# Patient Record
Sex: FEMALE | Race: WHITE | URBAN - METROPOLITAN AREA
[De-identification: names, ages, dates, MRNs, and addresses within clinical notes are randomized per-mention and may not be internally consistent; named-entity substitution may affect disease eponyms.]

---

## 2018-06-11 ENCOUNTER — TELEPHONE (OUTPATIENT)
Dept: DERMATOLOGY | Age: 58
End: 2018-06-11

## 2018-06-26 ENCOUNTER — PROCEDURE VISIT (OUTPATIENT)
Dept: DERMATOLOGY | Age: 58
End: 2018-06-26

## 2018-06-26 DIAGNOSIS — D18.00 ANGIOMA: Primary | ICD-10-CM

## 2018-06-26 DIAGNOSIS — I78.1 TELANGIECTASIA: ICD-10-CM

## 2018-06-26 DIAGNOSIS — Z41.1 ELECTIVE PROCEDURE FOR UNACCEPTABLE COSMETIC APPEARANCE: ICD-10-CM

## 2018-06-26 PROCEDURE — MISCHAIR23 LASER TEST SPOT: Performed by: DERMATOLOGY

## 2018-06-26 PROCEDURE — DM01300 VBEAM LASER EXTRA SMALL AREA: Performed by: DERMATOLOGY

## 2018-06-26 RX ORDER — TRETINOIN 0.1 MG/G
GEL TOPICAL NIGHTLY
COMMUNITY
End: 2021-12-06 | Stop reason: SDUPTHER

## 2018-06-26 RX ORDER — ESTRADIOL 10 UG/1
10 INSERT VAGINAL
COMMUNITY
Start: 2017-11-22 | End: 2021-12-06 | Stop reason: ALTCHOICE

## 2018-06-29 ENCOUNTER — TELEPHONE (OUTPATIENT)
Dept: DERMATOLOGY | Age: 58
End: 2018-06-29

## 2018-08-06 ENCOUNTER — PROCEDURE VISIT (OUTPATIENT)
Dept: DERMATOLOGY | Age: 58
End: 2018-08-06

## 2018-08-06 DIAGNOSIS — Z41.1 ELECTIVE PROCEDURE FOR UNACCEPTABLE COSMETIC APPEARANCE: ICD-10-CM

## 2018-08-06 DIAGNOSIS — I78.1 TELANGIECTASIA: Primary | ICD-10-CM

## 2018-08-06 PROCEDURE — MISCHAIR24 LASER FOLLOW UP SPOT: Performed by: DERMATOLOGY

## 2018-08-06 NOTE — PROGRESS NOTES
determined to be abnormal: Head/face. Dorsum of the nose with telangiectasias and cheeks with scattered red telangiectasias                Assessment and Plan     1. Spider angioma + telangiectasias  - Vbeam today  - will attempt to get records of previous trx again      Laser Procedure Note       Phoenix Nicole OF BIRTH:  1960    DATE OF VISIT:  2018  Chief Complaint   Patient presents with    Laser Treatment     PT returns today for her follow up and laser treatment. LASER: Vbeam #1  DIAGNOSIS: angioma/telangiectasias    See photo above. We discussed treatment options, including no treatment as well as the following:  - need for multiple treatments and risk of incomplete clearance, recurrence  - risk of erythema, edema, purpura, dyspigmentation and scarring    PATIENT IDENTIFIED PER PROTOCOL: yes  LOCATION(S): face  VERIFIED AND MARKED: yes  TECHNIQUES, RISKS, BENEFITS AND ALTERNATIVES EXPLAINED: yes  CONSENT SIGNED, WITNESSED AND DATED: yes        OPERATIVE REPORT    DIAGNOSIS, LOCATION, PROCEDURE RECONFIRMED: yes   EYE PROTECTION: yes  ANESTHESIA/PRE-OP MEDICATIONS: none  LASER SETTINGS:  (1)  WAVELENGTH: 595  LENS: 3x10  FLUENCE: 12.5  PULSE DURATION: 10  COOLIN/20  (2)  WAVELENGTH: 595  LENS: 10  FLUENCE: 7.5  PULSE DURATION: 10  COOLIN/20    PROCEDURE NOTE:  Individual telangiectasias on the nose and medial cheeks treated with single and double pulses with setting 1 with clearance. Remaining areas on the nose and cheeks then treated with setting 2. POST-OPERATIVE CARE/DISPOSITION: ice packs  COMPLICATIONS: none  MEDICATIONS: none  WOUND CARE INSTRUCTIONS PROVIDED: yes    F/u 1-2 mos.     75 - follow up - small

## 2018-12-10 ENCOUNTER — OFFICE VISIT (OUTPATIENT)
Dept: DERMATOLOGY | Age: 58
End: 2018-12-10
Payer: COMMERCIAL

## 2018-12-10 DIAGNOSIS — I78.1 SPIDER ANGIOMA: ICD-10-CM

## 2018-12-10 DIAGNOSIS — D22.9 MULTIPLE NEVI: Primary | ICD-10-CM

## 2018-12-10 DIAGNOSIS — I78.1 TELANGIECTASIA: ICD-10-CM

## 2018-12-10 PROCEDURE — 99213 OFFICE O/P EST LOW 20 MIN: CPT | Performed by: DERMATOLOGY

## 2018-12-10 PROCEDURE — MISCHAIR24 LASER FOLLOW UP SPOT: Performed by: DERMATOLOGY

## 2019-04-04 ENCOUNTER — PROCEDURE VISIT (OUTPATIENT)
Dept: DERMATOLOGY | Age: 59
End: 2019-04-04

## 2019-04-04 DIAGNOSIS — Z41.1 ELECTIVE PROCEDURE FOR UNACCEPTABLE COSMETIC APPEARANCE: ICD-10-CM

## 2019-04-04 DIAGNOSIS — I78.1 TELANGIECTASIA: Primary | ICD-10-CM

## 2019-04-04 PROCEDURE — MISCHAIR24 LASER FOLLOW UP SPOT: Performed by: DERMATOLOGY

## 2019-04-04 PROCEDURE — 99999 PR OFFICE/OUTPT VISIT,PROCEDURE ONLY: CPT | Performed by: DERMATOLOGY

## 2019-04-04 RX ORDER — FOLIC ACID 1 MG/1
TABLET ORAL
COMMUNITY
Start: 2019-03-29 | End: 2022-06-06

## 2019-04-04 NOTE — PROGRESS NOTES
[facility]  Jerardo Reed MD  [phone]      [facility]  1960    66 y.o. female     Date of Visit: 4/4/2019    Last seen:  for FSE    Chief Complaint: veins on the nose  Chief Complaint   Patient presents with    Laser Treatment     nose and any other spots      History of Present Illness:    Here for f/u s/p 3 treatment with Vbeam of a red lesion on the nasal dorsum, c/w angioma/telangiectasias. Has had significant improvement with 3 trx with Vbeam - had purpura transiently but reports treatment was much more tolerable than previous trx. Very happy with trx and results. Barely visible. Has a few telagniectasias on the cheeks that she would like treated again. She reports that it was treated many (7+?) times by Dr. Lore Joseph w/o clearance so was referred here and had a scab/wound with last treatment. Last treatment was around March of 2018 she believes. She does not have any records from previous trx and is unsure what type of laser/light device was used to treat - possibly IPL/BBL. ROS:  Gen: feels well; good general health  SKin: no changing moles/lesions    Past Medical History, Family History, Surgical History, Medications and Allergies reviewed. Outpatient Medications Marked as Taking for the 4/4/19 encounter (Procedure visit) with Salvador Arriaga MD   Medication Sig Dispense Refill    folic acid (FOLVITE) 1 MG tablet       tretinoin (RETIN-A) 0.01 % gel Apply topically nightly Apply topically nightly.  Estradiol (VAGIFEM) 10 MCG TABS vaginal tablet Place 10 mcg vaginally       No Known Allergies    History reviewed. No pertinent past medical history. History reviewed. No pertinent surgical history. Physical Examination     Gen, well-appearing    Dorsum of the nose with faint remaining telangiectasias and cheeks with scattered red telangiectasias    Baseline and today 4-2019 (after 3 trx):        Assessment and Plan     1.  Spider angioma + telangiectasias - improved  - Vbeam today      Laser Procedure Note       Felipa Roman   YOB: 1960    DATE OF VISIT:  2019  Chief Complaint   Patient presents with    Laser Treatment     nose and any other spots      LASER: Vbeam #4  DIAGNOSIS: angioma/telangiectasias    See photo above. We discussed treatment options, including no treatment as well as the following:  - need for multiple treatments and risk of incomplete clearance, recurrence  - risk of erythema, edema, purpura, dyspigmentation and scarring    PATIENT IDENTIFIED PER PROTOCOL: yes  LOCATION(S): face  VERIFIED AND MARKED: yes  TECHNIQUES, RISKS, BENEFITS AND ALTERNATIVES EXPLAINED: yes  CONSENT SIGNED, WITNESSED AND DATED: yes        OPERATIVE REPORT    DIAGNOSIS, LOCATION, PROCEDURE RECONFIRMED: yes   EYE PROTECTION: yes  ANESTHESIA/PRE-OP MEDICATIONS: none  LASER SETTINGS:  (1)  WAVELENGTH: 595  LENS: 7 mm, 9.75 J, 1.5 ms  30/20  (2)  WAVELENGTH: 3x10  FLUENCE: 12.5  PULSE DURATION: 10  COOLIN/20  (3)  WAVELENGTH: 595  LENS: 10  FLUENCE: 7.5  PULSE DURATION: 10  COOLIN/20    PROCEDURE NOTE:  Individual telangiectasias on the nose and medial cheeks treated with single and double pulses with setting 1 with clearance. Center of lesion on the nose treated with 2 pulses with setting 2. Remaining areas on the nose and cheeks then treated with setting 3    POST-OPERATIVE CARE/DISPOSITION: ice packs  COMPLICATIONS: none  MEDICATIONS: none  WOUND CARE INSTRUCTIONS PROVIDED: yes    F/u 1-2 mos.     75 - follow up - small

## 2019-12-12 ENCOUNTER — PROCEDURE VISIT (OUTPATIENT)
Dept: DERMATOLOGY | Age: 59
End: 2019-12-12
Payer: COMMERCIAL

## 2019-12-12 DIAGNOSIS — D48.5 NEOPLASM OF UNCERTAIN BEHAVIOR OF SKIN: ICD-10-CM

## 2019-12-12 DIAGNOSIS — D22.9 MULTIPLE NEVI: Primary | ICD-10-CM

## 2019-12-12 DIAGNOSIS — I78.1 TELANGIECTASIA: ICD-10-CM

## 2019-12-12 DIAGNOSIS — I78.1 SPIDER ANGIOMA: ICD-10-CM

## 2019-12-12 DIAGNOSIS — Z41.1 ELECTIVE PROCEDURE FOR UNACCEPTABLE COSMETIC APPEARANCE: ICD-10-CM

## 2019-12-12 PROCEDURE — DM01300 VBEAM LASER EXTRA SMALL AREA: Performed by: DERMATOLOGY

## 2019-12-12 PROCEDURE — 11102 TANGNTL BX SKIN SINGLE LES: CPT | Performed by: DERMATOLOGY

## 2019-12-12 PROCEDURE — 99213 OFFICE O/P EST LOW 20 MIN: CPT | Performed by: DERMATOLOGY

## 2019-12-20 ENCOUNTER — TELEPHONE (OUTPATIENT)
Dept: DERMATOLOGY | Age: 59
End: 2019-12-20

## 2020-01-02 ENCOUNTER — TELEPHONE (OUTPATIENT)
Dept: DERMATOLOGY | Age: 60
End: 2020-01-02

## 2021-09-13 ENCOUNTER — TELEPHONE (OUTPATIENT)
Dept: DERMATOLOGY | Age: 61
End: 2021-09-13

## 2021-09-13 NOTE — TELEPHONE ENCOUNTER
Pt calling wanting to schedule appt in the fall or December with  pls return call back @ [phone] to discuss

## 2021-12-06 ENCOUNTER — OFFICE VISIT (OUTPATIENT)
Dept: DERMATOLOGY | Age: 61
End: 2021-12-06
Payer: COMMERCIAL

## 2021-12-06 VITALS — TEMPERATURE: 97.6 F

## 2021-12-06 DIAGNOSIS — L81.4 LENTIGINES: ICD-10-CM

## 2021-12-06 DIAGNOSIS — D22.9 MULTIPLE NEVI: Primary | ICD-10-CM

## 2021-12-06 DIAGNOSIS — I78.1 TELANGIECTASIA: ICD-10-CM

## 2021-12-06 PROCEDURE — G8427 DOCREV CUR MEDS BY ELIG CLIN: HCPCS | Performed by: DERMATOLOGY

## 2021-12-06 PROCEDURE — 3017F COLORECTAL CA SCREEN DOC REV: CPT | Performed by: DERMATOLOGY

## 2021-12-06 PROCEDURE — G8484 FLU IMMUNIZE NO ADMIN: HCPCS | Performed by: DERMATOLOGY

## 2021-12-06 PROCEDURE — 1036F TOBACCO NON-USER: CPT | Performed by: DERMATOLOGY

## 2021-12-06 PROCEDURE — DM01310 VBEAM LASER SMALL OR 2 AREAS: Performed by: DERMATOLOGY

## 2021-12-06 PROCEDURE — 99213 OFFICE O/P EST LOW 20 MIN: CPT | Performed by: DERMATOLOGY

## 2021-12-06 PROCEDURE — G8421 BMI NOT CALCULATED: HCPCS | Performed by: DERMATOLOGY

## 2021-12-06 RX ORDER — LANOLIN ALCOHOL/MO/W.PET/CERES
CREAM (GRAM) TOPICAL
COMMUNITY

## 2021-12-06 RX ORDER — ESTRADIOL 0.1 MG/G
CREAM VAGINAL
COMMUNITY
Start: 2021-11-04

## 2021-12-06 RX ORDER — TRETINOIN 0.1 MG/G
GEL TOPICAL
Qty: 45 G | Refills: 5 | Status: SHIPPED | OUTPATIENT
Start: 2021-12-06

## 2021-12-06 NOTE — Clinical Note
I believe she wanted scheduled back for other laser or possibly botox - we had briefly discussed. It was one of the days you were out and I told her we'd call her later after you were back in the office to look ahead to get her scheduled for her next f/u.

## 2021-12-06 NOTE — PROGRESS NOTES
[facility]  Mainor Busby MD  [phone]      Virgilio Hernandez  1960    63 y.o. female     Date of Visit: 12/6/2021    Last seen:  for FSE and 4-2019 for laser    Chief Complaint: veins on the nose, moles/lesions  Chief Complaint   Patient presents with    Lesion(s)     TBSE, no concerns, No Hx of skin cancer    Laser Treatment     V-beam, nose     History of Present Illness:    1. Here for evaluation of multiple asx pigmented lesions on the trunk and extremities, present for many years; no change in size/shape/color of any lesions; no bleeding lesions. No personal hx of skin cancer. Dad with hx of skin cancer in his 66's. No known family hx of melanoma. 2. Here for Vbeam for telangiectasias, angioma. S/p ~4 treatment with Vbeam of a red lesion on the nasal dorsum, c/w angioma/telangiectasias. Has had significant improvement with trx with Vbeam - had purpura transiently but reports treatment was much more tolerable than previous trx. Very happy with trx and results. Barely visible. Has a few new telagniectasias on the cheeks that she would like treated again. She reports that it was treated many (7+?) times by Dr. Johnny Osman w/o clearance so was referred here and had a scab/wound with last treatment. Last treatment w them was around March of 2018. Unsure what type of laser/light device was used to treat - possibly IPL/BBL. ROS:  Gen: feels well; good general health  SKin: no changing moles/lesions    Past Medical History, Family History, Surgical History, Medications and Allergies reviewed.     Outpatient Medications Marked as Taking for the 12/6/21 encounter (Office Visit) with Marcia Treadwell MD   Medication Sig Dispense Refill    estradiol (ESTRACE) 0.1 MG/GM vaginal cream       Cholecalciferol (VITAMIN D3 PO) Take 5,000 Int'l Units by mouth daily      Calcium 500-100 MG-UNIT CHEW Take by mouth      folic acid (FOLVITE) 1 MG tablet       tretinoin (RETIN-A) 0.01 % gel Apply topically nightly Apply topically nightly. No Known Allergies    History reviewed. No pertinent past medical history. History reviewed. No pertinent surgical history. Physical Examination     Gen, well-appearing  FSE today     trunk and extremities with scattered brown macules and papules   Dorsum of the nose with faint remaining telangiectasias and cheeks with scattered red telangiectasias    Baseline and - (after 3 trx):        Assessment and Plan     1. Benign-appearing nevi and lentigines  - educ re ABCD's of MM   educ sun protection SPF 30+  encouraged skin check yearly (sooner if indicated), self checks monthly    *cerave products     2. Spider angioma + telangiectasias - improved  - Vbeam today    *path results from 2019 from [facility] (upper back - scanned in media - benign nevus)    Also briefly discussed botox, other lasers. Laser Procedure Note       Alleroberta Hence OF BIRTH:  1960    DATE OF VISIT:  2021  Chief Complaint   Patient presents with    Lesion(s)     TBSE, no concerns, No Hx of skin cancer    Laser Treatment     V-beam, nose     LASER: Vbeam #4  DIAGNOSIS: angioma/telangiectasias    See photo above.     We discussed treatment options, including no treatment as well as the following:  - need for multiple treatments and risk of incomplete clearance, recurrence  - risk of erythema, edema, purpura, dyspigmentation and scarring    PATIENT IDENTIFIED PER PROTOCOL: yes  LOCATION(S): face  VERIFIED AND MARKED: yes  TECHNIQUES, RISKS, BENEFITS AND ALTERNATIVES EXPLAINED: yes  CONSENT SIGNED, WITNESSED AND DATED: yes    OPERATIVE REPORT    DIAGNOSIS, LOCATION, PROCEDURE RECONFIRMED: yes   EYE PROTECTION: yes  ANESTHESIA/PRE-OP MEDICATIONS: none  LASER SETTINGS:  (1)  WAVELENGTH: 595  LENS: 7 mm, 9.75 J, 1.5 ms  30/20  - NOT USED TODAY  (2)  WAVELENGTH: 3x10  FLUENCE: 12.5  PULSE DURATION: 10  COOLIN/20  (3)  WAVELENGTH: 595 LENS: 10  FLUENCE: 7.5  PULSE DURATION: 10  COOLIN/20    PROCEDURE NOTE:  Individual telangiectasias on the nose and medial cheeks treated with single and double pulses with setting 2 with clearance. Remaining areas on the nose and cheeks then treated with setting 3    POST-OPERATIVE CARE/DISPOSITION: ice packs  COMPLICATIONS: none  MEDICATIONS: none  WOUND CARE INSTRUCTIONS PROVIDED: yes    F/u 1-2 mos.     200 - small  ( in the past depending on area)

## 2022-02-07 ENCOUNTER — TELEPHONE (OUTPATIENT)
Dept: DERMATOLOGY | Age: 62
End: 2022-02-07

## 2022-02-07 ENCOUNTER — PATIENT MESSAGE (OUTPATIENT)
Dept: DERMATOLOGY | Age: 62
End: 2022-02-07

## 2022-02-07 NOTE — TELEPHONE ENCOUNTER
Patient lmvm /@ 11:02 requesting a return call. Patient states her father  and is asking if it is ok to fly after receiving Botox and fillers. Pt has and appt for  at 3:45. Please advise. Thank you! [phone].

## 2022-02-07 NOTE — TELEPHONE ENCOUNTER
From: Erick Wisdom  To: Dr. Mireya Ahuja: 2022 2:45 PM EST  Subject: Tomorrows appointment     My dad  and Ill be flying to [place] Thursday morning. I have an appointment tomorrow afternoon for Botox/filler/laser. Can I fly Thursday? I also called and left a message this morning regarding this question.    Thanks,  Charlotte Beaver

## 2022-02-07 NOTE — TELEPHONE ENCOUNTER
Patient's appointment on 2/8/2022 @ [phone] and leave Thursday on 7AM for a flight to [place].     Can patient fly on Thursday am?

## 2022-02-08 ENCOUNTER — PROCEDURE VISIT (OUTPATIENT)
Dept: DERMATOLOGY | Age: 62
End: 2022-02-08

## 2022-02-08 VITALS — TEMPERATURE: 97.2 F

## 2022-02-08 DIAGNOSIS — L98.8 WRINKLES: Primary | ICD-10-CM

## 2022-02-08 DIAGNOSIS — I78.1 TELANGIECTASIA: ICD-10-CM

## 2022-02-08 PROCEDURE — DM00140 PR DERM ONLY BOTOX INJ LEVEL 5: Performed by: DERMATOLOGY

## 2022-02-08 PROCEDURE — DM01300 VBEAM LASER EXTRA SMALL AREA: Performed by: DERMATOLOGY

## 2022-02-08 NOTE — PATIENT INSTRUCTIONS
Nicotinamide 500 mg daily - twice daily    Following the procedure, the treated areas may be red or appear bruised and will likely be swollen for a few hours or up to a few days. The affected areas should be treated delicately following treatment. Discomfort and swelling should be treated with the application of hourly cool compresses the evening of the procedure. Avoid applying ice directly to the skin. If your face was treated, you may want to sleep with your head elevated on an extra pillow to help minimize swelling. Wear sunscreen daily and avoid strenuous activity following rosacea treatments to optimize results. Avoid extra aspirin, ibuprofen, vitamin E following the procedure - this may increase your chance of bruising. Improper care of the treated area while the discoloration is present may increase the chance of scarring or skin textural changes to the treated area. Please call the office if you have excessive discomfort, herpes simplex virus flare, or burns, blisters, or scabbing. Post-Injection Care:  Do not lie flat or turn upside down x 4 hrs after treatment. Do not massage treated areas for 24-48 hours (avoid clarisonic-type devices). It is OK to apply makeup and wash your face gently. It can take 2-4 days for onset of effects. Full effects can take up to 10-14 days. Call in the next 1-2 weeks if there are any concerns or questions or if you think additional treatment is needed. Bruising can occur but should not interfere with results. Avoid aspirin, ibuprofen before future treatments.

## 2022-02-08 NOTE — PROGRESS NOTES
[facility]  Babita Mulligan MD  [address] [facility]  1960    63 y.o. female     Date of Visit: 2/8/2022    Last seen: 1 month ago for Vbeam    Chief Complaint: veins on the nose, wrinkles  Chief Complaint   Patient presents with    Procedure     History of Present Illness:    1. Here for trx of facial wrinkles - glabella + perioral lines/lower face wrinkles. No previous trx. No bleeding probs or NM probs. Not pregnant or BF.    2. Here for Vbeam for telangiectasias, angioma. Most recently 1 month ago with good results/imrprovement - focally remaining. S/p ~5 treatment with Vbeam of a red lesion on the nasal dorsum, c/w angioma/telangiectasias. Has had significant improvement with trx with Vbeam - had purpura transiently but reports treatment was much more tolerable than previous trx. Very happy with trx and results. Barely visible. Has a few new telagniectasias on the cheeks that she would like treated again. She reports that it was treated many (7+?) times by Dr. Olvera Sep w/o clearance so was referred here and had a scab/wound with last treatment. Last treatment w them was around March of 2018. Unsure what type of laser/light device was used to treat - possibly IPL/BBL. ROS:  Gen: feels well; good general health    Past Medical History, Family History, Surgical History, Medications and Allergies reviewed. Outpatient Medications Marked as Taking for the 2/8/22 encounter (Procedure visit) with Justin Bonilla MD   Medication Sig Dispense Refill    estradiol (ESTRACE) 0.1 MG/GM vaginal cream       Cholecalciferol (VITAMIN D3 PO) Take 5,000 Int'l Units by mouth daily      Calcium 500-100 MG-UNIT CHEW Take by mouth      tretinoin (RETIN-A) 0.01 % gel Apply topically nightly. 45 g 5     No Known Allergies    History reviewed. No pertinent past medical history. History reviewed. No pertinent surgical history.     Physical Examination     Gen, well-appearing                  Baseline and  (after 3 trx):        Assessment and Plan     1. wrinkles  - Discussed expectations. Ed risks/SE including bruising, incomplete correction and asymmetry. Pt consented to trx. Botox (dil 3 ml in 100 units)   Total 0.6 ml to glabella  (0.075  0.15  0.15  0.15  0.075)  Total 0.1 ml to tails  Further diluted 1:1 for the upper lip (1.66 u/0.1 ml) and injected 0.05 x 4 sites to upper lip  ed no lying flat x 4 hrs   ed no massage of treated areas   ed onset   ed full effects can take up to 10-14 days   ed duration     27 units - 324    - f/u in 2 weeks and proceed with filler for perioral lines + MM area    2. Spider angioma + telangiectasias - improved  - Vbeam today    *path results from 2019 from [facility] (upper back - scanned in media - benign nevus)    Also briefly discussed botox, other lasers. Laser Procedure Note       Julito Moss OF BIRTH:  1960    DATE OF VISIT:  2022  Chief Complaint   Patient presents with    Procedure     LASER: Vbeam   DIAGNOSIS: angioma/telangiectasias    See photo above.     We discussed treatment options, including no treatment as well as the following:  - need for multiple treatments and risk of incomplete clearance, recurrence  - risk of erythema, edema, purpura, dyspigmentation and scarring    PATIENT IDENTIFIED PER PROTOCOL: yes  LOCATION(S): face  VERIFIED AND MARKED: yes  TECHNIQUES, RISKS, BENEFITS AND ALTERNATIVES EXPLAINED: yes  CONSENT SIGNED, WITNESSED AND DATED: yes    OPERATIVE REPORT    DIAGNOSIS, LOCATION, PROCEDURE RECONFIRMED: yes   EYE PROTECTION: yes  ANESTHESIA/PRE-OP MEDICATIONS: none  LASER SETTINGS:  (1)  WAVELENGTH: 595  LENS: 7 mm, 9.75 J, 1.5 ms  30/20  - NOT USED TODAY  (2)  WAVELENGTH: 3x10  FLUENCE: 12.5  PULSE DURATION: 10  COOLIN/20  (3)  WAVELENGTH: 595  LENS: 10  FLUENCE: 7.5  PULSE DURATION: 10  COOLIN/20    PROCEDURE NOTE:  Individual telangiectasias on the nose and medial cheeks treated with single and double pulses with setting 2 with clearance. Remaining areas on the nose and cheeks then treated with setting 3    POST-OPERATIVE CARE/DISPOSITION: ice packs  COMPLICATIONS: none  MEDICATIONS: none  WOUND CARE INSTRUCTIONS PROVIDED: yes    F/u 1-2 mos.     100- small  ( in the past depending on area)

## 2022-02-23 ENCOUNTER — PROCEDURE VISIT (OUTPATIENT)
Dept: DERMATOLOGY | Age: 62
End: 2022-02-23

## 2022-02-23 VITALS — TEMPERATURE: 97 F

## 2022-02-23 DIAGNOSIS — L98.8 WRINKLES: Primary | ICD-10-CM

## 2022-02-23 PROCEDURE — DM00200 PR DERM ONLY JUVEDERM ULTRA XC: Performed by: DERMATOLOGY

## 2022-02-23 NOTE — PATIENT INSTRUCTIONS
Post-Injection Care:  Do not massage treated areas for 2-3 days. It is OK to apply makeup tomorrow and wash your face gently. You may initially feel bumps or palpable product under the surface of the skin but this typically dissipates as swelling diminishes over the coming days - next 1 to 2 weeks. If you do feel that there are any visible lumps after a few days, you may start to gently massage the area after a few days to diminish the bump. Full effects can take up to 10-14 days as the filler absorbs water and incorporates into the skin. Call if you have any severely painful areas, areas of discoloration or abnormal paleness, areas with blisters or pustules or any other questions. Call in the next 2 weeks if there are any other questions or if you think additional treatment is needed. Bruising can occur but should not interfere with results. Avoid aspirin, ibuprofen before future treatments.     1 syringe - 550

## 2022-02-23 NOTE — PROGRESS NOTES
[facility]  Babita Mulligan MD  [address] [facility]  1960    63 y.o. female     Date of Visit: 2/23/2022    Last seen: 2 weeks ago    Chief Complaint: wrinkles  Chief Complaint   Patient presents with   Swathiartemio Foreman[place] Penny palominovedcasey     History of Present Illness:    Here for f/u s/p botox of facial wrinkles - glabella + perioral lines/lower face wrinkles. 1st trx 2 weeks ago. No complications. Happy with results - glabella smoother, fairly immobile and brows feel higher. No bleeding probs or NM probs. Not pregnant or BF. Here for filler for the lower face. Has had improvement in perioral lines and upper lip appears smoother/dc with trx 2 weeks ago and no negative SE/sensation. No probs drinking. Vbeam previously. S/p ~5 treatment with Vbeam of a red lesion on the nasal dorsum, c/w angioma/telangiectasias. Has had significant improvement with trx with Vbeam - had purpura transiently but reports treatment was much more tolerable than previous trx. Very happy with trx and results. Barely visible. Has a few new telagniectasias on the cheeks that she would like treated again. She reports that it was treated many (7+?) times by Dr. May Blanco w/o clearance so was referred here and had a scab/wound with last treatment. Last treatment w them was around March of 2018. Unsure what type of laser/light device was used to treat - possibly IPL/BBL. ROS:  Gen: feels well; good general health    Past Medical History, Family History, Surgical History, Medications and Allergies reviewed. Outpatient Medications Marked as Taking for the 2/23/22 encounter (Procedure visit) with Justin Bonilla MD   Medication Sig Dispense Refill    estradiol (ESTRACE) 0.1 MG/GM vaginal cream       Cholecalciferol (VITAMIN D3 PO) Take 5,000 Int'l Units by mouth daily      Calcium 500-100 MG-UNIT CHEW Take by mouth      tretinoin (RETIN-A) 0.01 % gel Apply topically nightly.  45 g 5 No Known Allergies    History reviewed. No pertinent past medical history. History reviewed. No pertinent surgical history. Physical Examination     Gen, well-appearing    Baseline 2 weeks ago:              Baseline and 4-2019 (after 3 trx):        Assessment and Plan     1. Wrinkles  Patient consented and questions answered, expectations discussed and risks/benefits discussed including bruising, asymmetry, palpable product and erythema and rare vascular compromise/necrosis. Areas anesthetized with topical LMX x 15 min. Focal lido blebs injected for cannula insertion. juvederm ultra XC injected to   - MM area and commissures with 27 g cannula and needle  - few upper lip lines with needle  - focal etched perioral cheek lines with needle    No noted complications. Treated areas iced. Ed aftercare, no rubbing, ice and call with questions. F/u prn for concerns or additional filler if desired.     550      Before and 2 weeks after botox:

## 2022-03-09 ENCOUNTER — OFFICE VISIT (OUTPATIENT)
Dept: DERMATOLOGY | Age: 62
End: 2022-03-09

## 2022-03-09 VITALS — TEMPERATURE: 45 F

## 2022-03-09 DIAGNOSIS — L98.8 WRINKLES: Primary | ICD-10-CM

## 2022-03-09 PROCEDURE — 99999 PR OFFICE/OUTPT VISIT,PROCEDURE ONLY: CPT | Performed by: DERMATOLOGY

## 2022-03-09 NOTE — PROGRESS NOTES
[facility]  De Owens MD  [address] [facility]  1960    63 y.o. female     Date of Visit: 3/9/2022    Last seen: 2 weeks ago    Chief Complaint: wrinkles  Chief Complaint   [facility]   [facility]     History of Present Illness:    Here for f/u s/p filler at last visit 2 weeks ago - MM area, perioral etched lines, upper lip focal lines and commissures. S/p botox 1 month ago for facial wrinkles - glabella + perioral lines. No complications. Happy with results - glabella smoother, overall immobile and brows feel higher. No bleeding probs or NM probs. Not pregnant or BF. Has had improvement in perioral lines and upper lip appears smoother/dc with trx 2 weeks ago and no negative SE/sensation. No probs drinking. Vbeam previously. S/p ~5 treatment with Vbeam of a red lesion on the nasal dorsum, c/w angioma/telangiectasias. Has had significant improvement with trx with Vbeam - had purpura transiently but reports treatment was much more tolerable than previous trx. Very happy with trx and results. Barely visible. Has a few new telagniectasias on the cheeks that she would like treated again. She reports that it was treated many (7+?) times by Dr. Riley Pleitez w/o clearance so was referred here and had a scab/wound with last treatment. Last treatment w them was around March of 2018. Unsure what type of laser/light device was used to treat - possibly IPL/BBL. ROS:  Gen: feels well; good general health    Past Medical History, Family History, Surgical History, Medications and Allergies reviewed.     Outpatient Medications Marked as Taking for the 3/9/22 encounter (Office Visit) with Serg Blair MD   Medication Sig Dispense Refill    estradiol (ESTRACE) 0.1 MG/GM vaginal cream       Cholecalciferol (VITAMIN D3 PO) Take 5,000 Int'l Units by mouth daily      Calcium 500-100 MG-UNIT CHEW Take by mouth      tretinoin (RETIN-A) 0.01 % gel Apply topically nightly. 45 g 5     No Known Allergies    History reviewed. No pertinent past medical history. History reviewed. No pertinent surgical history. Physical Examination     Gen, well-appearing    Baseline 2-2022              Baseline and 4-2019 (after 3 trx):        Assessment and Plan     1. Wrinkles  Patient consented and questions answered, expectations discussed and risks/benefits discussed including bruising, asymmetry, palpable product and erythema and rare vascular compromise/necrosis. Areas anesthetized with topical LMX x 15 min. Focal lido blebs injected for cannula insertion. 0.15 cc remaining juvederm ultra XC injected to   - few focal etches lines    No noted complications. Treated areas iced. Ed aftercare, no rubbing, ice and call with questions. F/u prn for concerns or additional filler if desired. NC - f/u. Before filler and 2 weeks after:            Also treated nasal dorsum with Vbeam today.   3 pulses with 3x10, 12.5, 10, 30/20 - NC

## 2022-03-13 NOTE — PROGRESS NOTES
[facility] Dermat[facility]  Alejandra Crews MD  [address] [facility]  1960    66 y.o. female     Date of Visit: 12/10/2018    Last seen: 8-2018    Chief Complaint: veins on the nose, moles/lesions  Chief Complaint   Patient presents with    Laser Treatment     History of Present Illness:    1. Here for evaluation of multiple asx pigmented lesions on the trunk and extremities, present for many years; no change in size/shape/color of any lesions; no bleeding lesions. No personal hx of skin cancer. Dad with hx of skin cancer in his 66's. No known family hx of melanoma. 2. Here for f/u s/p 2 treatment with Vbeam of a red lesion on the nasal dorsum, c/w angioma/telangiectasias. Has had significant improvement with 2 trx with Vbeam - had purpura transiently but reports treatment was much more tolerable than previous trx. Very happy with trx. She reports that it was treated many (7+?) times by Dr. Franci Arora w/o clearance so was referred here and had a scab/wound with last treatment. Last treatment was around March of 2018 she believes. She does not have any records from previous trx and is unsure what type of laser/light device was used to treat - possibly IPL/BBL. Is asx. She denies bleeding from the area. Had been present/worsening for a few years before any trx and believes that it worsened after manipulation during a facial.    ROS:  Gen: feels well; good general health  SKin: no changing moles/lesions    Past Medical History, Family History, Surgical History, Medications and Allergies reviewed. Outpatient Prescriptions Marked as Taking for the 12/10/18 encounter (Office Visit) with Guy Nugent MD   Medication Sig Dispense Refill    tretinoin (RETIN-A) 0.01 % gel Apply topically nightly Apply topically nightly.  Estradiol (VAGIFEM) 10 MCG TABS vaginal tablet Place 10 mcg vaginally       No Known Allergies    History reviewed.  No pertinent past medical history. History reviewed. No pertinent surgical history. Physical Examination     Gen, well-appearing  The following were examined and determined to be normal: Psych/Neuro, Scalp/hair, Head/face, Conjunctivae/eyelids, Gums/teeth/lips, Neck, Breast/axilla/chest, Abdomen, Back, RUE, LUE, RLE, LLE, Nails/digits and buttocks. The following were examined and determined to be abnormal: none. trunk and extremities with scattered brown macules and papules   Dorsum of the nose with faint remaining telangiectasias and cheeks with scattered red telangiectasias                Assessment and Plan     1. Benign-appearing nevi  - educ re ABCD's of MM   educ sun protection   encouraged skin check yearly (sooner if indicated), self checks monthly    2. Spider angioma + telangiectasias - improved  - Vbeam today      Laser Procedure Note       Tyler Bang   YOB: 1960    DATE OF VISIT:  12/10/2018  Chief Complaint   Patient presents with    Laser Treatment     LASER: Vbeam #1  DIAGNOSIS: angioma/telangiectasias    See photo above.     We discussed treatment options, including no treatment as well as the following:  - need for multiple treatments and risk of incomplete clearance, recurrence  - risk of erythema, edema, purpura, dyspigmentation and scarring    PATIENT IDENTIFIED PER PROTOCOL: yes  LOCATION(S): face  VERIFIED AND MARKED: yes  TECHNIQUES, RISKS, BENEFITS AND ALTERNATIVES EXPLAINED: yes  CONSENT SIGNED, WITNESSED AND DATED: yes        OPERATIVE REPORT    DIAGNOSIS, LOCATION, PROCEDURE RECONFIRMED: yes   EYE PROTECTION: yes  ANESTHESIA/PRE-OP MEDICATIONS: none  LASER SETTINGS:  (1)  WAVELENGTH: 595  LENS: 7 mm, 9.75 J, 1.5 ms  30/20  3x10  FLUENCE: 12.5  PULSE DURATION: 10  COOLIN/20  (2)  WAVELENGTH: 595  LENS: 10  FLUENCE: 7.5  PULSE DURATION: 10  COOLIN/20    PROCEDURE NOTE:  Individual telangiectasias on the nose and medial cheeks treated with single and double pulses with setting 1 Him/He

## 2022-06-06 ENCOUNTER — PROCEDURE VISIT (OUTPATIENT)
Dept: DERMATOLOGY | Age: 62
End: 2022-06-06

## 2022-06-06 DIAGNOSIS — L98.8 WRINKLES: Primary | ICD-10-CM

## 2022-06-06 DIAGNOSIS — I78.1 TELANGIECTASIA: ICD-10-CM

## 2022-06-06 PROCEDURE — DM00140 PR DERM ONLY BOTOX INJ LEVEL 5: Performed by: DERMATOLOGY

## 2022-06-06 NOTE — PATIENT INSTRUCTIONS
Post-Injection Care:  Do not lie flat or turn upside down x 4 hrs after treatment. Do not massage treated areas for 24-48 hours (avoid clarisonic-type devices). It is OK to apply makeup and wash your face gently. It can take 2-4 days for onset of effects. Full effects can take up to 10-14 days. Call in the next 1-2 weeks if there are any concerns or questions or if you think additional treatment is needed. Bruising can occur but should not interfere with results. Avoid aspirin, ibuprofen before future treatments. 324.00    Following the procedure, the treated areas may be red or appear bruised and will likely be swollen for a few hours or up to a few days. The affected areas should be treated delicately following treatment. Discomfort and swelling should be treated with the application of hourly cool compresses the evening of the procedure. Avoid applying ice directly to the skin. If your face was treated, you may want to sleep with your head elevated on an extra pillow to help minimize swelling. Wear sunscreen daily and avoid strenuous activity following rosacea treatments to optimize results. Avoid extra aspirin, ibuprofen, vitamin E following the procedure - this may increase your chance of bruising. Improper care of the treated area while the discoloration is present may increase the chance of scarring or skin textural changes to the treated area. Please call the office if you have excessive discomfort, herpes simplex virus flare, or burns, blisters, or scabbing.     100.00

## 2022-06-06 NOTE — PROGRESS NOTES
[facility] Dermatology  Liborio Lynn MD  [address] [facility]  1960    63 y.o. female     Date of Visit: 6/6/2022    Last seen: 3-2022    Chief Complaint: veins on the nose, wrinkles  Chief Complaint   Patient presents with    Procedure     BOTOX     History of Present Illness:    1. Here for trx of facial wrinkles - glabella + perioral lines/lower face wrinkles. 1 previous trx with good results. Just starting to wear off. No bleeding probs or NM probs. Not pregnant or BF. 2. Vbeam for telangiectasias, angioma. Most recently 3 month ago with good results/imrprovement - focally remaining. S/p ~5 treatment with Vbeam of a red lesion on the nasal dorsum, c/w angioma/telangiectasias. Has had significant improvement with trx with Vbeam - had purpura transiently but reports treatment was much more tolerable than previous trx. Very happy with trx and results. Barely visible. Has a few new telagniectasias on the cheeks that she would like treated again. She reports that it was treated many (7+?) times by Dr. Peter Clifton w/o clearance so was referred here and had a scab/wound with last treatment. Last treatment w them was around March of 2018. Unsure what type of laser/light device was used to treat - possibly IPL/BBL. ROS:  Gen: feels well; good general health    Past Medical History, Family History, Surgical History, Medications and Allergies reviewed. Outpatient Medications Marked as Taking for the 6/6/22 encounter (Procedure visit) with Cirssy Childers MD   Medication Sig Dispense Refill    estradiol (ESTRACE) 0.1 MG/GM vaginal cream       Cholecalciferol (VITAMIN D3 PO) Take 5,000 Int'l Units by mouth daily      Calcium 500-100 MG-UNIT CHEW Take by mouth      tretinoin (RETIN-A) 0.01 % gel Apply topically nightly. 45 g 5     No Known Allergies    No past medical history on file. No past surgical history on file.     Physical Examination     Gen, well-appearing                  Baseline and  (after 3 trx):        Assessment and Plan     1. wrinkles  - Discussed expectations. Ed risks/SE including bruising, incomplete correction and asymmetry. Pt consented to trx. Botox (dil 3 ml in 100 units)   Total 0.6 ml to glabella  (0.075  0.15  0.15  0.15  0.075)  Total 0.1 ml to tails  Further diluted 1:1 for the upper lip (1.66 u/0.1 ml) and injected 0.05 x 4 sites to upper lip  ed no lying flat x 4 hrs   ed no massage of treated areas   ed onset   ed full effects can take up to 10-14 days   ed duration     27 units - 324    - f/u in 2 weeks and proceed with filler for perioral lines + MM area    2. Spider angioma + telangiectasias - improved  - Vbeam today - no extra charge today    *path results from 2019 from [place] (upper back - scanned in media - benign nevus)    Also briefly discussed botox, other lasers. Laser Procedure Note       Cyril Lemus OF BIRTH:  1960    DATE OF VISIT:  2022  Chief Complaint   Patient presents with    Procedure     BOTOX     LASER: Vbeam   DIAGNOSIS: angioma/telangiectasias    See photo above.     We discussed treatment options, including no treatment as well as the following:  - need for multiple treatments and risk of incomplete clearance, recurrence  - risk of erythema, edema, purpura, dyspigmentation and scarring    PATIENT IDENTIFIED PER PROTOCOL: yes  LOCATION(S): face  VERIFIED AND MARKED: yes  TECHNIQUES, RISKS, BENEFITS AND ALTERNATIVES EXPLAINED: yes  CONSENT SIGNED, WITNESSED AND DATED: yes    OPERATIVE REPORT    DIAGNOSIS, LOCATION, PROCEDURE RECONFIRMED: yes   EYE PROTECTION: yes  ANESTHESIA/PRE-OP MEDICATIONS: none  LASER SETTINGS:  (1)  WAVELENGTH: 595  LENS: 7 mm, 9.75 J, 1.5 ms  30/20  - NOT USED TODAY  (2)  WAVELENGTH: 3x10  FLUENCE: 12.5  PULSE DURATION: 10  COOLIN/20  (3)  WAVELENGTH: 595  LENS: 10  FLUENCE: 7.5  PULSE DURATION: 10  COOLIN/20    PROCEDURE NOTE:  Individual telangiectasias on the nose and medial cheeks treated with single and double pulses with setting 2 with clearance. POST-OPERATIVE CARE/DISPOSITION: ice packs  COMPLICATIONS: none  MEDICATIONS: none  WOUND CARE INSTRUCTIONS PROVIDED: yes    F/u 1-2 mos.     NC today - touch up ( in the past depending on area)

## 2022-10-11 ENCOUNTER — PROCEDURE VISIT (OUTPATIENT)
Dept: DERMATOLOGY | Age: 62
End: 2022-10-11

## 2022-10-11 DIAGNOSIS — L98.8 WRINKLES: Primary | ICD-10-CM

## 2022-10-11 PROCEDURE — DM00140 PR DERM ONLY BOTOX INJ LEVEL 5: Performed by: DERMATOLOGY

## 2022-10-11 PROCEDURE — DM00200 PR DERM ONLY JUVEDERM ULTRA XC: Performed by: DERMATOLOGY

## 2022-10-11 NOTE — PATIENT INSTRUCTIONS
Post-Injection Care:  Do not lie flat or turn upside down x 4 hrs after treatment. Do not massage treated areas for 24-48 hours (avoid clarisonic-type devices). It is OK to apply makeup and wash your face gently. It can take 2-4 days for onset of effects. Full effects can take up to 10-14 days. Call in the next 1-2 weeks if there are any concerns or questions or if you think additional treatment is needed. Bruising can occur but should not interfere with results. Avoid aspirin, ibuprofen before future treatments.      BOTOX 324.00 + FILLER 550.00

## 2022-10-11 NOTE — PROGRESS NOTES
[facility] Dermat[facility]  Dain Askew MD  [address] [facility]  1960    63 y.o. female     Date of Visit: 10/11/2022    Last seen: 3-2022    Chief Complaint: veins on the nose, wrinkles  Chief Complaint   Patient presents with    Botox Injection    Other     juvederm     History of Present Illness:    1. Here for trx of facial wrinkles - glabella + perioral lines/lower face wrinkles. Previous trx with good results. Botox starting to wear off. No bleeding probs or NM probs. Not pregnant or BF. Ready for filler again too. 2. Vbeam for telangiectasias, angioma. Nose markedly better. S/p ~5 treatment with Vbeam of a red lesion on the nasal dorsum, c/w angioma/telangiectasias. Has had significant improvement with trx with Vbeam - had purpura transiently but reports treatment was much more tolerable than previous trx. Very happy with trx and results. Barely visible. Has a few new telagniectasias on the cheeks that she would like treated again. She reports that it was treated many (7+?) times by Dr. García Older w/o clearance so was referred here and had a scab/wound with last treatment. Last treatment w them was around March of 2018. Unsure what type of laser/light device was used to treat - possibly IPL/BBL. ROS:  Gen: feels well; good general health    Past Medical History, Family History, Surgical History, Medications and Allergies reviewed. Outpatient Medications Marked as Taking for the 10/11/22 encounter (Procedure visit) with Juventino Georges MD   Medication Sig Dispense Refill    estradiol (ESTRACE) 0.1 MG/GM vaginal cream       Cholecalciferol (VITAMIN D3 PO) Take 5,000 Int'l Units by mouth daily      Calcium 500-100 MG-UNIT CHEW Take by mouth      tretinoin (RETIN-A) 0.01 % gel Apply topically nightly. 45 g 5     No Known Allergies    History reviewed. No pertinent past medical history. History reviewed. No pertinent surgical history.     Physical Examination     Gen, well-appearing                        Baseline and 4-2019 (after 3 trx):        Assessment and Plan     1. wrinkles  - Discussed expectations. Ed risks/SE including bruising, incomplete correction and asymmetry. Pt consented to trx. Botox (dil 3 ml in 100 units)   Total 0.6 ml to glabella  (0.075  0.15  0.15  0.15  0.075)  Total 0.1 ml to tails  Further diluted 1:1 for the upper lip (1.66 u/0.1 ml) and injected 0.05 x 4 sites to upper lip  ed no lying flat x 4 hrs   ed no massage of treated areas   ed onset   ed full effects can take up to 10-14 days   ed duration     27 units - 324    Patient consented and questions answered, expectations discussed and risks/benefits discussed including bruising, asymmetry, palpable product and erythema and rare vascular compromise/necrosis. Areas anesthetized with topical LMX x 15 min. Focal lido blebs injected for cannula insertion. Juvederm ultra XC injected to   - MM area/lateral chin to jawline and with 27 g cannula  - commissures and perioral cheek lines with needle    No noted complications. Treated areas iced. Ed aftercare, no rubbing, ice and call with questions. F/u prn for concerns or additional filler if desired.     - f/u in 2 weeks and proceed with filler for perioral lines + addl to MM area prn    filler - 550    *path results from 2019 from [place] (upper back - scanned in media - benign nevus)

## 2022-10-27 ENCOUNTER — OFFICE VISIT (OUTPATIENT)
Dept: DERMATOLOGY | Age: 62
End: 2022-10-27

## 2022-10-27 DIAGNOSIS — L98.8 WRINKLES: Primary | ICD-10-CM

## 2022-10-27 DIAGNOSIS — I78.1 TELANGIECTASIA: ICD-10-CM

## 2022-10-27 PROCEDURE — 99999 PR OFFICE/OUTPT VISIT,PROCEDURE ONLY: CPT | Performed by: DERMATOLOGY

## 2022-10-27 NOTE — PROGRESS NOTES
[facility]at[facility]  Lindsey Stephens MD  [address] [facility]  1960    63 y.o. female     Date of Visit: 10/27/2022    Last seen: 3-2022    Chief Complaint: veins on the nose, wrinkles  No chief complaint on file. History of Present Illness:    1. Here for 2 week f/u s/p trx of facial wrinkles - glabella + perioral lines/lower face wrinkles. Botox + filler at last visit. Has a few remaining lip lines to treat with filler. Good results. No complications other than small transient bruise at cannula site. No bleeding probs or NM probs. Not pregnant or BF. 2. Vbeam for telangiectasias, angioma. Nose markedly better. Will treat today. S/p ~5 treatment with Vbeam of a red lesion on the nasal dorsum, c/w angioma/telangiectasias. Has had significant improvement with trx with Vbeam - had purpura transiently but reports treatment was much more tolerable than previous trx. Very happy with trx and results. Barely visible. Has a few new telagniectasias on the cheeks that she would like treated again. She reports that it was treated many (7+?) times by Dr. Silvano Judge w/o clearance so was referred here and had a scab/wound with last treatment. Last treatment w them was around March of 2018. Unsure what type of laser/light device was used to treat - possibly IPL/BBL. ROS:  Gen: feels well; good general health    Past Medical History, Family History, Surgical History, Medications and Allergies reviewed. Outpatient Medications Marked as Taking for the 10/27/22 encounter (Office Visit) with Gurmeet Milton MD   Medication Sig Dispense Refill    estradiol (ESTRACE) 0.1 MG/GM vaginal cream       Cholecalciferol (VITAMIN D3 PO) Take 5,000 Int'l Units by mouth daily      Calcium 500-100 MG-UNIT CHEW Take by mouth      tretinoin (RETIN-A) 0.01 % gel Apply topically nightly. 45 g 5     No Known Allergies    History reviewed. No pertinent past medical history.   History reviewed. No pertinent surgical history. Physical Examination     Gen, well-appearing    Before recent trx and 2 weeks after                           Baseline and  (after 3 trx):        Assessment and Plan     1. wrinkles    Patient consented and questions answered, expectations discussed and risks/benefits discussed including bruising, asymmetry, palpable product and erythema and rare vascular compromise/necrosis. Areas anesthetized with topical LMX x 15 min. Focal lido blebs injected for cannula insertion. Remaining 0.25 cc Juvederm ultra XC injected to   - vertical upper lip lines and few lower lip radiating lines  - tiny amount to vemillon border of upper lip  - commissures with needle    No noted complications. Treated areas iced. Ed aftercare, no rubbing, ice and call with questions. F/u prn for concerns or additional filler if desired. NC - follow up    *path results from 2019 from [address] (upper back - scanned in media - benign nevus)    Laser Procedure Note       Adrian Sweet OF BIRTH:  1960    DATE OF VISIT:  10/27/2022  No chief complaint on file.     LASER: Vbeam  DIAGNOSIS: telangiectasias - nose    We discussed treatment options, including no treatment as well as the following:  - need for multiple treatments and risk of incomplete clearance, recurrence  - risk of erythema, edema, purpura, dyspigmentation and scarring    PATIENT IDENTIFIED PER PROTOCOL: yes  LOCATION(S): face  VERIFIED AND MARKED: yes  TECHNIQUES, RISKS, BENEFITS AND ALTERNATIVES EXPLAINED: yes  CONSENT SIGNED, WITNESSED AND DATED: yes        OPERATIVE REPORT    DIAGNOSIS, LOCATION, PROCEDURE RECONFIRMED: yes   APPROPRIATE EYE PROTECTION for PATIENT: yes  APPROPRIATE EYE PROTECTION for PROVIDER and OTHERS PRESENT: yes  ANESTHESIA/PRE-OP MEDICATIONS: none  LASER SETTINGS:  (1)  WAVELENGTH: 595  LENS: 3x10  FLUENCE: 12.5  PULSE DURATION: 10 COOLIN/20  (2)  WAVELENGTH: 595  LENS: 10  FLUENCE: 7.5 PULSE DURATION: 10  COOLIN/20    PROCEDURE NOTE:  Individual telangiectasias treated with setting 1 - 2 pulses and then single pass with setting 2.     POST-OPERATIVE CARE/DISPOSITION: ice pack  COMPLICATIONS: none  MEDICATIONS: none  WOUND CARE INSTRUCTIONS PROVIDED: yes    NC

## 2023-02-21 ENCOUNTER — PROCEDURE VISIT (OUTPATIENT)
Dept: DERMATOLOGY | Age: 63
End: 2023-02-21
Payer: COMMERCIAL

## 2023-02-21 DIAGNOSIS — L81.4 LENTIGINES: ICD-10-CM

## 2023-02-21 DIAGNOSIS — D22.9 MULTIPLE NEVI: Primary | ICD-10-CM

## 2023-02-21 DIAGNOSIS — L73.8 SEBACEOUS GLAND HYPERPLASIA: ICD-10-CM

## 2023-02-21 DIAGNOSIS — L82.1 SEBORRHEIC KERATOSIS: ICD-10-CM

## 2023-02-21 PROCEDURE — G8427 DOCREV CUR MEDS BY ELIG CLIN: HCPCS | Performed by: DERMATOLOGY

## 2023-02-21 PROCEDURE — G8484 FLU IMMUNIZE NO ADMIN: HCPCS | Performed by: DERMATOLOGY

## 2023-02-21 PROCEDURE — G8421 BMI NOT CALCULATED: HCPCS | Performed by: DERMATOLOGY

## 2023-02-21 PROCEDURE — 99213 OFFICE O/P EST LOW 20 MIN: CPT | Performed by: DERMATOLOGY

## 2023-02-21 PROCEDURE — 3017F COLORECTAL CA SCREEN DOC REV: CPT | Performed by: DERMATOLOGY

## 2023-02-21 PROCEDURE — 1036F TOBACCO NON-USER: CPT | Performed by: DERMATOLOGY

## 2023-02-21 RX ORDER — TRETINOIN 0.1 MG/G
GEL TOPICAL
Qty: 45 G | Refills: 5 | Status: SHIPPED | OUTPATIENT
Start: 2023-02-21

## 2023-05-01 ENCOUNTER — PROCEDURE VISIT (OUTPATIENT)
Dept: DERMATOLOGY | Age: 63
End: 2023-05-01

## 2023-05-01 DIAGNOSIS — L98.8 WRINKLES: Primary | ICD-10-CM

## 2023-05-01 DIAGNOSIS — I78.1 TELANGIECTASIA: ICD-10-CM

## 2023-05-01 PROCEDURE — DM01300 VBEAM LASER EXTRA SMALL AREA: Performed by: DERMATOLOGY

## 2023-05-01 PROCEDURE — DM00140 PR DERM ONLY BOTOX INJ LEVEL 5: Performed by: DERMATOLOGY

## 2023-05-01 NOTE — PROGRESS NOTES
[facility] Dermatology  Herve Hankins MD  [address] [facility]  1960    63 y.o. female     Date of Visit: 5/1/2023    Last seen: 2-2023    Chief Complaint: veins on the nose, wrinkles  Chief Complaint   Patient presents with    Procedure     Botox  Filler? Vbeam Nose     *2 family weddings this summer - June and end of summer ([place]); June wedding and that engagement party is planned to be formal and more extravagant   *granddaughter's aortic reconstruction planned for this summer    History of Present Illness:    1. Here for trx of facial wrinkles - glabella + perioral lines/lower face wrinkles. Previous trx with good results. Botox wearing off. No bleeding probs or NM probs. Not pregnant or BF. Ready for filler again too but will wait until summer d/t event timing. 2. Vbeam for telangiectasias, angioma. Nose markedly better. Here for trx. S/p ~5 treatment with Vbeam of a red lesion on the nasal dorsum, c/w angioma/telangiectasias. Has had significant improvement with trx with Vbeam - had purpura transiently but reports treatment was much more tolerable than previous trx. Very happy with trx and results. Barely visible. Has a few new telagniectasias on the cheeks that she would like treated again. She reports that it was treated many (7+?) times by Dr. Eva Magallanes w/o clearance so was referred here and had a scab/wound with last treatment. Last treatment w them was around March of 2018. Unsure what type of laser/light device was used to treat - possibly IPL/BBL. ROS:  Gen: feels well; good general health    Past Medical History, Family History, Surgical History, Medications and Allergies reviewed. Outpatient Medications Marked as Taking for the 5/1/23 encounter (Procedure visit) with Mac Seay MD   Medication Sig Dispense Refill    tretinoin (RETIN-A) 0.01 % gel Apply topically nightly.  45 g 5    Cholecalciferol (VITAMIN D3 PO) Take 5,000 Int'l Units

## 2023-05-01 NOTE — PATIENT INSTRUCTIONS
Post-Injection Care:  Do not lie flat or turn upside down x 4 hrs after treatment. Do not massage treated areas for 24-48 hours (avoid clarisonic-type devices). It is OK to apply makeup and wash your face gently. It can take 2-4 days for onset of effects. Full effects can take up to 10-14 days. Call in the next 1-2 weeks if there are any concerns or questions or if you think additional treatment is needed. Bruising can occur but should not interfere with results. Avoid aspirin, ibuprofen before future treatments. $324 (btx) + 100 (Vbeam)    Following the procedure, the treated areas may be red or appear bruised and will likely be swollen for a few hours or up to a few days. The affected areas should be treated delicately following treatment. Discomfort and swelling should be treated with the application of hourly cool compresses the evening of the procedure. Avoid applying ice directly to the skin. If your face was treated, you may want to sleep with your head elevated on an extra pillow to help minimize swelling. Wear sunscreen daily and avoid strenuous activity following rosacea treatments to optimize results. Avoid extra aspirin, ibuprofen, vitamin E following the procedure - this may increase your chance of bruising. Improper care of the treated area while the discoloration is present may increase the chance of scarring or skin textural changes to the treated area. Please call the office if you have excessive discomfort, herpes simplex virus flare, or burns, blisters, or scabbing.

## 2023-08-14 ENCOUNTER — PROCEDURE VISIT (OUTPATIENT)
Dept: DERMATOLOGY | Age: 63
End: 2023-08-14

## 2023-08-14 DIAGNOSIS — L98.8 WRINKLES: Primary | ICD-10-CM

## 2023-08-14 NOTE — PROGRESS NOTES
[facility] Dermatology  Vinay Hope MD  [address] [facility]  1960    66 y.o. female     Date of Visit: 8/14/2023    Last seen: 5-2023    Chief Complaint: veins on the nose, wrinkles  Chief Complaint   Patient presents with    Procedure     Filler, Botox  ? Laser     *2 family weddings this summer - June and end of summer ([place]); June wedding and that engagement party is planned to be formal and more extravagant   *granddaughter's aortic reconstruction planned for this summer    History of Present Illness:    1. Here for trx of facial wrinkles - glabella + perioral lines/lower face wrinkles. Previous trx with good results. Botox wearing off. No bleeding probs or NM probs. Not pregnant or BF. Ready for filler again now. 2. Vbeam for telangiectasias, angioma. Nose markedly better. Will plan for trx at f/u in 2 weeks. S/p ~5 treatment with Vbeam of a red lesion on the nasal dorsum, c/w angioma/telangiectasias. Has had significant improvement with trx with Vbeam - had purpura transiently but reports treatment was much more tolerable than previous trx. Very happy with trx and results. Barely visible. Has a few new telagniectasias on the cheeks that she would like treated again. She reports that it was treated many (7+?) times by Dr. Stephania Meyer w/o clearance so was referred here and had a scab/wound with last treatment. Last treatment w them was around March of 2018. Unsure what type of laser/light device was used to treat - possibly IPL/BBL. ROS:  Gen: feels well; good general health    Past Medical History, Family History, Surgical History, Medications and Allergies reviewed. Outpatient Medications Marked as Taking for the 8/14/23 encounter (Procedure visit) with Yaritza Coronel MD   Medication Sig Dispense Refill    tretinoin (RETIN-A) 0.01 % gel Apply topically nightly.  45 g 5    Cholecalciferol (VITAMIN D3 PO) Take 5,000 Int'l Units by mouth daily

## 2023-08-28 ENCOUNTER — PROCEDURE VISIT (OUTPATIENT)
Dept: DERMATOLOGY | Age: 63
End: 2023-08-28

## 2023-08-28 DIAGNOSIS — I78.1 TELANGIECTASIA: ICD-10-CM

## 2023-08-28 DIAGNOSIS — L98.8 WRINKLES: Primary | ICD-10-CM

## 2023-08-28 NOTE — PROGRESS NOTES
Cholecalciferol (VITAMIN D3 PO) Take 5,000 Int'l Units by mouth daily      Calcium 500-100 MG-UNIT CHEW Take by mouth       No Known Allergies    History reviewed. No pertinent past medical history. History reviewed. No pertinent surgical history. Physical Examination     Gen, well-appearing                              Baseline and 4-2019 (after 3 trx):        Assessment and Plan     Sept 23 wedding. 1. Wrinkles/volume loss  Patient consented and questions answered, expectations discussed and risks/benefits discussed including bruising, asymmetry, palpable product and erythema and rare vascular compromise/necrosis. Areas anesthetized with topical LMX x 15 min. Focal lido blebs injected for cannula insertion. Remainig 0.2 cc Juvederm ultra XC injected to   - - focal MM area, upper lip, commissures and perioral cheek lines with needle     No noted complications. Treated areas iced. Ed aftercare, no rubbing, ice and call with questions. F/u prn for concerns or additional filler if desired. NC - f/u     Laser today for the nose     Laser Procedure Note       Lutricia Olivas OF BIRTH:  1960    DATE OF VISIT:  6/6/2022    Chief Complaint   Patient presents with    Procedure     Botox  Filler? Vbeam Nose       LASER: Vbeam   DIAGNOSIS: angioma/telangiectasias    See photo above.     We discussed treatment options, including no treatment as well as the following:  - need for multiple treatments and risk of incomplete clearance, recurrence  - risk of erythema, edema, purpura, dyspigmentation and scarring    PATIENT IDENTIFIED PER PROTOCOL: yes  LOCATION(S): face  VERIFIED AND MARKED: yes  TECHNIQUES, RISKS, BENEFITS AND ALTERNATIVES EXPLAINED: yes  CONSENT SIGNED, WITNESSED AND DATED: yes    OPERATIVE REPORT    DIAGNOSIS, LOCATION, PROCEDURE RECONFIRMED: yes   EYE PROTECTION: yes  ANESTHESIA/PRE-OP MEDICATIONS: none  LASER SETTINGS:  (1)  WAVELENGTH: 595  LENS: 7 mm, 9.5 J, 1.5 ms  30/20  - not

## 2024-02-27 ENCOUNTER — OFFICE VISIT (OUTPATIENT)
Dept: DERMATOLOGY | Age: 64
End: 2024-02-27
Payer: COMMERCIAL

## 2024-02-27 DIAGNOSIS — I78.1 TELANGIECTASIA: ICD-10-CM

## 2024-02-27 DIAGNOSIS — L82.1 SEBORRHEIC KERATOSIS: ICD-10-CM

## 2024-02-27 DIAGNOSIS — L81.4 LENTIGINES: ICD-10-CM

## 2024-02-27 DIAGNOSIS — D22.9 MULTIPLE NEVI: Primary | ICD-10-CM

## 2024-02-27 DIAGNOSIS — L73.8 SEBACEOUS GLAND HYPERPLASIA: ICD-10-CM

## 2024-02-27 PROCEDURE — G8421 BMI NOT CALCULATED: HCPCS | Performed by: DERMATOLOGY

## 2024-02-27 PROCEDURE — G8484 FLU IMMUNIZE NO ADMIN: HCPCS | Performed by: DERMATOLOGY

## 2024-02-27 PROCEDURE — 3017F COLORECTAL CA SCREEN DOC REV: CPT | Performed by: DERMATOLOGY

## 2024-02-27 PROCEDURE — 1036F TOBACCO NON-USER: CPT | Performed by: DERMATOLOGY

## 2024-02-27 PROCEDURE — G8427 DOCREV CUR MEDS BY ELIG CLIN: HCPCS | Performed by: DERMATOLOGY

## 2024-02-27 PROCEDURE — 99213 OFFICE O/P EST LOW 20 MIN: CPT | Performed by: DERMATOLOGY

## 2024-02-27 NOTE — PROGRESS NOTES
daily      Calcium 500-100 MG-UNIT CHEW Take by mouth       No Known Allergies    History reviewed. No pertinent past medical history.  History reviewed. No pertinent surgical history.    Physical Examination     Gen, well-appearing  FSE today     trunk and extremities with scattered brown macules and papules   Dorsum of the nose with subtle faint remaining telangiectasias and cheeks with scattered red telangiectasias  Back with stuck-on dull-surfaced brown papules    Upper FH with subtle skin-colored - yellowish small papules    Baseline and 4-2019 (after 3 trx):        Assessment and Plan     1. Benign-appearing nevi and lentigines  - Monitor for ABCD's of MM and si/sx of NMSC  Continue sun protection - OTC sunscreen with SPF 30-50+ recommended and reviewed usage  Encouraged skin check yearly (sooner if indicated), self checks    *cerave products     2. SK - back  3. C/w [facility] - upper FH  - reassured re benign fx    4. Spider angioma + telangiectasias - improved  - plan for Vbeam as desired    Rtn for botox/filler as desired.    *path results from 2019 from [facility] (upper back - scanned in media - benign nevus)    Refill tretinoin prn - tolerating well.

## 2025-08-14 ENCOUNTER — TELEPHONE (OUTPATIENT)
Age: 65
End: 2025-08-14